# Patient Record
Sex: MALE | Race: OTHER | NOT HISPANIC OR LATINO | URBAN - METROPOLITAN AREA
[De-identification: names, ages, dates, MRNs, and addresses within clinical notes are randomized per-mention and may not be internally consistent; named-entity substitution may affect disease eponyms.]

---

## 2020-11-28 ENCOUNTER — EMERGENCY (EMERGENCY)
Facility: HOSPITAL | Age: 40
LOS: 1 days | Discharge: HOME | End: 2020-11-28
Attending: STUDENT IN AN ORGANIZED HEALTH CARE EDUCATION/TRAINING PROGRAM
Payer: COMMERCIAL

## 2020-11-28 VITALS
RESPIRATION RATE: 16 BRPM | SYSTOLIC BLOOD PRESSURE: 183 MMHG | OXYGEN SATURATION: 96 % | HEART RATE: 137 BPM | WEIGHT: 160.94 LBS | DIASTOLIC BLOOD PRESSURE: 119 MMHG | TEMPERATURE: 98 F

## 2020-11-28 VITALS
OXYGEN SATURATION: 100 % | SYSTOLIC BLOOD PRESSURE: 128 MMHG | RESPIRATION RATE: 20 BRPM | TEMPERATURE: 99 F | DIASTOLIC BLOOD PRESSURE: 82 MMHG | HEART RATE: 132 BPM

## 2020-11-28 DIAGNOSIS — D47.3 ESSENTIAL (HEMORRHAGIC) THROMBOCYTHEMIA: ICD-10-CM

## 2020-11-28 LAB
ANION GAP SERPL CALC-SCNC: 10 MMOL/L — SIGNIFICANT CHANGE UP (ref 5–17)
ANISOCYTOSIS BLD QL: SIGNIFICANT CHANGE UP
APTT BLD: 34.5 SEC — SIGNIFICANT CHANGE UP (ref 27.5–35.5)
BASOPHILS # BLD AUTO: 0.1 K/UL — SIGNIFICANT CHANGE UP (ref 0–0.2)
BASOPHILS NFR BLD AUTO: 0.4 % — SIGNIFICANT CHANGE UP (ref 0–2)
BUN SERPL-MCNC: 23 MG/DL — HIGH (ref 7–18)
CALCIUM SERPL-MCNC: 9 MG/DL — SIGNIFICANT CHANGE UP (ref 8.4–10.5)
CHLORIDE SERPL-SCNC: 100 MMOL/L — SIGNIFICANT CHANGE UP (ref 96–108)
CO2 SERPL-SCNC: 26 MMOL/L — SIGNIFICANT CHANGE UP (ref 22–31)
CREAT SERPL-MCNC: 1.15 MG/DL — SIGNIFICANT CHANGE UP (ref 0.5–1.3)
EOSINOPHIL # BLD AUTO: 0.13 K/UL — SIGNIFICANT CHANGE UP (ref 0–0.5)
EOSINOPHIL NFR BLD AUTO: 0.5 % — SIGNIFICANT CHANGE UP (ref 0–6)
GLUCOSE SERPL-MCNC: 181 MG/DL — HIGH (ref 70–99)
HCT VFR BLD CALC: 35.2 % — LOW (ref 39–50)
HGB BLD-MCNC: 11.8 G/DL — LOW (ref 13–17)
HYPOCHROMIA BLD QL: SLIGHT — SIGNIFICANT CHANGE UP
HYPOGRANULAR PLT: PRESENT
IMM GRANULOCYTES NFR BLD AUTO: 0.5 % — SIGNIFICANT CHANGE UP (ref 0–1.5)
INR BLD: 1.16 RATIO — SIGNIFICANT CHANGE UP (ref 0.88–1.16)
LACTATE SERPL-SCNC: 2 MMOL/L — SIGNIFICANT CHANGE UP (ref 0.7–2)
LG PLATELETS BLD QL AUTO: SIGNIFICANT CHANGE UP
LYMPHOCYTES # BLD AUTO: 2.38 K/UL — SIGNIFICANT CHANGE UP (ref 1–3.3)
LYMPHOCYTES # BLD AUTO: 9.8 % — LOW (ref 13–44)
MACROCYTES BLD QL: SIGNIFICANT CHANGE UP
MANUAL SMEAR VERIFICATION: SIGNIFICANT CHANGE UP
MCHC RBC-ENTMCNC: 29.7 PG — SIGNIFICANT CHANGE UP (ref 27–34)
MCHC RBC-ENTMCNC: 33.5 GM/DL — SIGNIFICANT CHANGE UP (ref 32–36)
MCV RBC AUTO: 88.7 FL — SIGNIFICANT CHANGE UP (ref 80–100)
MONOCYTES # BLD AUTO: 1.52 K/UL — HIGH (ref 0–0.9)
MONOCYTES NFR BLD AUTO: 6.3 % — SIGNIFICANT CHANGE UP (ref 2–14)
NEUTROPHILS # BLD AUTO: 19.95 K/UL — HIGH (ref 1.8–7.4)
NEUTROPHILS NFR BLD AUTO: 82.5 % — HIGH (ref 43–77)
NEUTS BAND # BLD: 1 % — SIGNIFICANT CHANGE UP (ref 0–8)
NRBC # BLD: 0 /100 WBCS — SIGNIFICANT CHANGE UP (ref 0–0)
NRBC # BLD: 0 /100 — SIGNIFICANT CHANGE UP (ref 0–0)
PLAT MORPH BLD: ABNORMAL
PLATELET # BLD AUTO: 1575 K/UL — CRITICAL HIGH (ref 150–400)
PLATELET COUNT - ESTIMATE: ABNORMAL
POTASSIUM SERPL-MCNC: 3.6 MMOL/L — SIGNIFICANT CHANGE UP (ref 3.5–5.3)
POTASSIUM SERPL-SCNC: 3.6 MMOL/L — SIGNIFICANT CHANGE UP (ref 3.5–5.3)
PROTHROM AB SERPL-ACNC: 13.7 SEC — HIGH (ref 10.6–13.6)
RAPID RVP RESULT: SIGNIFICANT CHANGE UP
RBC # BLD: 3.97 M/UL — LOW (ref 4.2–5.8)
RBC # FLD: 13 % — SIGNIFICANT CHANGE UP (ref 10.3–14.5)
RBC BLD AUTO: ABNORMAL
SARS-COV-2 RNA SPEC QL NAA+PROBE: SIGNIFICANT CHANGE UP
SMUDGE CELLS # BLD: PRESENT — SIGNIFICANT CHANGE UP
SODIUM SERPL-SCNC: 136 MMOL/L — SIGNIFICANT CHANGE UP (ref 135–145)
STOMATOCYTES BLD QL SMEAR: SLIGHT — SIGNIFICANT CHANGE UP
VARIANT LYMPHS # BLD: 2 % — SIGNIFICANT CHANGE UP (ref 0–6)
WBC # BLD: 24.2 K/UL — HIGH (ref 3.8–10.5)
WBC # FLD AUTO: 24.2 K/UL — HIGH (ref 3.8–10.5)

## 2020-11-28 PROCEDURE — 80048 BASIC METABOLIC PNL TOTAL CA: CPT

## 2020-11-28 PROCEDURE — 36415 COLL VENOUS BLD VENIPUNCTURE: CPT

## 2020-11-28 PROCEDURE — 96375 TX/PRO/DX INJ NEW DRUG ADDON: CPT

## 2020-11-28 PROCEDURE — 70487 CT MAXILLOFACIAL W/DYE: CPT

## 2020-11-28 PROCEDURE — 96361 HYDRATE IV INFUSION ADD-ON: CPT

## 2020-11-28 PROCEDURE — 85610 PROTHROMBIN TIME: CPT

## 2020-11-28 PROCEDURE — 96365 THER/PROPH/DIAG IV INF INIT: CPT | Mod: XU

## 2020-11-28 PROCEDURE — 87040 BLOOD CULTURE FOR BACTERIA: CPT

## 2020-11-28 PROCEDURE — 99292 CRITICAL CARE ADDL 30 MIN: CPT

## 2020-11-28 PROCEDURE — 71275 CT ANGIOGRAPHY CHEST: CPT

## 2020-11-28 PROCEDURE — 0225U NFCT DS DNA&RNA 21 SARSCOV2: CPT

## 2020-11-28 PROCEDURE — 93005 ELECTROCARDIOGRAM TRACING: CPT

## 2020-11-28 PROCEDURE — 85025 COMPLETE CBC W/AUTO DIFF WBC: CPT

## 2020-11-28 PROCEDURE — 71275 CT ANGIOGRAPHY CHEST: CPT | Mod: 26

## 2020-11-28 PROCEDURE — 85730 THROMBOPLASTIN TIME PARTIAL: CPT

## 2020-11-28 PROCEDURE — 99291 CRITICAL CARE FIRST HOUR: CPT

## 2020-11-28 PROCEDURE — 83605 ASSAY OF LACTIC ACID: CPT

## 2020-11-28 PROCEDURE — 99292 CRITICAL CARE ADDL 30 MIN: CPT | Mod: 25

## 2020-11-28 PROCEDURE — 70487 CT MAXILLOFACIAL W/DYE: CPT | Mod: 26

## 2020-11-28 PROCEDURE — 99291 CRITICAL CARE FIRST HOUR: CPT | Mod: 25

## 2020-11-28 RX ORDER — SODIUM CHLORIDE 9 MG/ML
1000 INJECTION INTRAMUSCULAR; INTRAVENOUS; SUBCUTANEOUS ONCE
Refills: 0 | Status: COMPLETED | OUTPATIENT
Start: 2020-11-28 | End: 2020-11-28

## 2020-11-28 RX ORDER — TRANEXAMIC ACID 100 MG/ML
5 INJECTION, SOLUTION INTRAVENOUS ONCE
Refills: 0 | Status: COMPLETED | OUTPATIENT
Start: 2020-11-28 | End: 2020-11-28

## 2020-11-28 RX ORDER — VALSARTAN 80 MG/1
160 TABLET ORAL ONCE
Refills: 0 | Status: COMPLETED | OUTPATIENT
Start: 2020-11-28 | End: 2020-11-28

## 2020-11-28 RX ORDER — SODIUM CHLORIDE 9 MG/ML
500 INJECTION INTRAMUSCULAR; INTRAVENOUS; SUBCUTANEOUS ONCE
Refills: 0 | Status: COMPLETED | OUTPATIENT
Start: 2020-11-28 | End: 2020-11-28

## 2020-11-28 RX ORDER — LABETALOL HCL 100 MG
5 TABLET ORAL ONCE
Refills: 0 | Status: COMPLETED | OUTPATIENT
Start: 2020-11-28 | End: 2020-11-28

## 2020-11-28 RX ORDER — SODIUM CHLORIDE 9 MG/ML
3 INJECTION INTRAMUSCULAR; INTRAVENOUS; SUBCUTANEOUS EVERY 8 HOURS
Refills: 0 | Status: DISCONTINUED | OUTPATIENT
Start: 2020-11-28 | End: 2020-12-01

## 2020-11-28 RX ORDER — HYDROXYUREA 500 MG/1
2 CAPSULE ORAL
Qty: 120 | Refills: 0
Start: 2020-11-28 | End: 2020-12-27

## 2020-11-28 RX ORDER — LABETALOL HCL 100 MG
10 TABLET ORAL ONCE
Refills: 0 | Status: DISCONTINUED | OUTPATIENT
Start: 2020-11-28 | End: 2020-11-28

## 2020-11-28 RX ADMIN — TRANEXAMIC ACID 5 MILLILITER(S): 100 INJECTION, SOLUTION INTRAVENOUS at 04:35

## 2020-11-28 RX ADMIN — SODIUM CHLORIDE 1000 MILLILITER(S): 9 INJECTION INTRAMUSCULAR; INTRAVENOUS; SUBCUTANEOUS at 08:06

## 2020-11-28 RX ADMIN — SODIUM CHLORIDE 1000 MILLILITER(S): 9 INJECTION INTRAMUSCULAR; INTRAVENOUS; SUBCUTANEOUS at 05:30

## 2020-11-28 RX ADMIN — SODIUM CHLORIDE 500 MILLILITER(S): 9 INJECTION INTRAMUSCULAR; INTRAVENOUS; SUBCUTANEOUS at 04:30

## 2020-11-28 RX ADMIN — Medication 5 MILLIGRAM(S): at 04:00

## 2020-11-28 RX ADMIN — Medication 1 MILLIGRAM(S): at 11:46

## 2020-11-28 RX ADMIN — SODIUM CHLORIDE 500 MILLILITER(S): 9 INJECTION INTRAMUSCULAR; INTRAVENOUS; SUBCUTANEOUS at 04:53

## 2020-11-28 RX ADMIN — VALSARTAN 160 MILLIGRAM(S): 80 TABLET ORAL at 11:46

## 2020-11-28 RX ADMIN — SODIUM CHLORIDE 3 MILLILITER(S): 9 INJECTION INTRAMUSCULAR; INTRAVENOUS; SUBCUTANEOUS at 04:52

## 2020-11-28 RX ADMIN — SODIUM CHLORIDE 1000 MILLILITER(S): 9 INJECTION INTRAMUSCULAR; INTRAVENOUS; SUBCUTANEOUS at 08:50

## 2020-11-28 RX ADMIN — SODIUM CHLORIDE 1000 MILLILITER(S): 9 INJECTION INTRAMUSCULAR; INTRAVENOUS; SUBCUTANEOUS at 06:41

## 2020-11-28 NOTE — ED PROVIDER NOTE - OBJECTIVE STATEMENT
40 year old male with PMHx of hypertension and no significant PSHx presents to the ED with complaints of bleeding from his wisdom tooth extraction site. Patient states that at approximately 12:00 yesterday he had his right-sided inferior wisdom tooth extracted after imaging showed tooth decay. Patient reports that as far as he is aware, the surgery was performed without any complications. Patient endorses that at approximately 22:00 last night he began having bleeding from the surgical site, which has persisted since then. Patient otherwise denies any pain, pus drainage, fever, infectious symptoms, lightheadedness, syncope, chest pain, shortness of breath, and all other acute complaints. Patient denies any recent illness or hospitalizations. Patient notes that he is not currently on any blood thinners.   Allergies: Keflex (pruritus), Amlodipine (pruritus) 40 year old male with PMHx of hypertension and no significant PSHx presents to the ED with complaints of bleeding from his wisdom tooth extraction site. Patient states that at approximately 12:00 noon yesterday he had his right-sided inferior wisdom tooth extracted after imaging showed tooth decay. Patient reports that as far as he is aware, the surgery was performed without any complications. Patient endorses that at approximately 22:00 last night he began having bleeding from the surgical site, which has persisted since then. Patient otherwise denies any pain, pus drainage, fever, infectious symptoms, lightheadedness, syncope, chest pain, shortness of breath, and all other acute complaints. Patient denies any recent illness or hospitalizations. Patient notes that he is not currently on any blood thinners.   Allergies: Keflex (pruritus), Amlodipine (pruritus)

## 2020-11-28 NOTE — ED PROVIDER NOTE - CLINICAL SUMMARY MEDICAL DECISION MAKING FREE TEXT BOX
Patient presents to the ED with complaints of bleeding from tooth extraction site. No signs of infection or arterial bleeding. Labs pending. Will apply TXA. Given Labetalol to lower blood pressure. Patient stable. Will reassess.

## 2020-11-28 NOTE — CONSULT NOTE ADULT - ASSESSMENT
40 year old presented with platelet 1.5 M and bleeding after tooth extraction.  the bleeding stopped now.  He denies any systemic symptoms.  he insists to go home.  but willing to take pills and follow up with doc.

## 2020-11-28 NOTE — ED PROVIDER NOTE - CARE PLAN
Principal Discharge DX:	Bleeding   Principal Discharge DX:	Thrombocytosis  Secondary Diagnosis:	Coagulopathy  Secondary Diagnosis:	Sepsis without acute organ dysfunction, due to unspecified organism   Principal Discharge DX:	Thrombocytosis  Secondary Diagnosis:	Coagulopathy  Secondary Diagnosis:	Sepsis without acute organ dysfunction, due to unspecified organism  Secondary Diagnosis:	Tachycardia

## 2020-11-28 NOTE — ED PROVIDER NOTE - PATIENT PORTAL LINK FT
You can access the FollowMyHealth Patient Portal offered by [facility] by registering at the following website: [email or web address]. By joining [employer]’s FollowMyHealth portal, you will also be able to view your health information using other applications (apps) compatible with our system.

## 2020-11-28 NOTE — CONSULT NOTE ADULT - PROBLEM SELECTOR RECOMMENDATION 9
smear showed many large platelets.  but no immature cells.  once platelet more than 1M, he can have bleeding issue.  the tooth bleeding stoped.  most likely he has essential thrombocythemia  he needs w/u plus treatment quickly  will start HU 1000mg bid  will not give ASA   he has tachycardia.  he said his HR can be 80's at home.  he tends to get nervous and has tachycardia easily  he refused to stay despite me and Dr. Elise repeated effort.
>120

## 2020-11-28 NOTE — ED PROVIDER NOTE - CARE PROVIDER_API CALL
Marciano Marquez  INTERNAL MEDICINE  [facility] [address] [place] [id number]  Phone: [phone]  Fax: [phone]  Follow Up Time: 4-6 Days

## 2020-11-28 NOTE — ED PROVIDER NOTE - NSFOLLOWUPINSTRUCTIONS_ED_ALL_ED_FT
Sepsis    WHAT YOU NEED TO KNOW:    Sepsis happens when an infection spreads and causes your body to react strongly to germs. Your body's defense system normally releases chemicals to fight off infection at the infected area. When infection spreads, chemicals are released throughout your body. The chemicals cause inflammation and clotting in small blood vessels that is difficult to control. Inflammation and clotting decrease blood flow and oxygen to your organs. This may cause your organs to stop working correctly. Sepsis is a life-threatening emergency.    DISCHARGE INSTRUCTIONS:    Call your local emergency number (911 in the ) if:   •You are short of breath or you cough up blood.      •You have a fast heart rate and your chest hurts.      •You feel so dizzy that you have trouble standing up.      Seek care immediately if:   •You have increased swelling in your legs, feet, or abdomen.      •Your lips or fingernails are blue.      Call your doctor if:   •You have a fever.      •You have muscle or joint pain.      •You begin to have trouble sleeping, nightmares, or panic attacks.      •You have questions or concerns about your condition or care.      Medicines:   •Medicines help treat an infection or decrease your symptoms.      •Take your medicine as directed. Contact your healthcare provider if you think your medicine is not helping or if you have side effects. Tell him of her if you are allergic to any medicine. Keep a list of the medicines, vitamins, and herbs you take. Include the amounts, and when and why you take them. Bring the list or the pill bottles to follow-up visits. Carry your medicine list with you in case of an emergency.      Prevent infection:   •Wash your hands often. Use soap and water. Wash your hands after you use the bathroom, change a child's diapers, or sneeze. Do not touch your eyes, nose, or mouth unless you have washed your hands first. Wash your hands before you prepare or eat food. Carry hand  with you. You can use it to clean your hands when soap and water are not available.  Handwashing           •Ask about vaccines. Vaccines can decrease your risk for certain infections, such as the flu or pneumonia.      •Prevent the spread of germs. Try to stay away from people who have a cold or the flu. If you are sick, stay away from others as much as possible.      Follow up with your doctor as directed: Write down your questions so you remember to ask them during your visits.       © Copyright [employer] 2020           back to top                          © Copyright [employer] 2020               Atrial Tachycardia    WHAT YOU NEED TO KNOW:  Atrial tachycardia is a condition that causes your heart to beat more than 100 times each minute. Atrial tachycardia is also called supraventricular tachycardia. It can develop because of problems with your heart's electrical system. Any of the following may also put you at risk for atrial tachycardia:•A heart condition, hypertension, or fatigue      •Anxiety, stress, or pain      •Large amounts of caffeine from coffee, tea, and energy drinks      •Heavy alcohol use or cigarette smoking      •Use of some medicines or street drugs    DISCHARGE INSTRUCTIONS:    Call 911 or have someone call if:  •You have any of the following signs of a heart attack:?Squeezing, pressure, or pain in your chest that lasts longer than a few minutes. Chest pain may come and go.      ?Pain in your jaw, neck, one or both arms, upper and lower back, or stomach.      ?Shortness of breath, or panting      ?Nausea or vomiting      ?Lightheadedness      •You cannot be woken.       Contact your healthcare provider if:  •Your pulse is faster than your healthcare provider said it should be.       •You have frequent periods of a fast heart rate.       •You feel weak or dizzy.      •You have questions or concerns about your condition or care.       Follow up with your healthcare provider as directed: You may need more tests. Write down your questions so you remember to ask them during your visits.    Prevention and Management:   •Decrease the amount of caffeine you drink. Caffeine can increase your heart rate.       •Limit or do not drink alcohol. Alcohol can increase your heart rate. Ask your healthcare provider if it is safe for you to drink alcohol. Also ask how much is safe for you to drink.       •Do not smoke. Nicotine and other chemicals in cigarettes can cause damage to your heart. Ask your healthcare provider for information if you currently smoke and need help to quit. E-cigarettes or smokeless tobacco still contain nicotine. Talk to your healthcare provider before you use these products.       •Do not use illegal drugs. Drugs such as meth and cocaine can increase your heart rate. Talk to your healthcare provider if you use illegal drugs and need help to quit.      •Get more rest. Fatigue can cause your heart rate to increase.       •Learn ways to cope with stress. Stress, fear, and anxiety can cause a fast heart rate. Your healthcare provider may recommend relaxation techniques and deep breathing exercises. Your healthcare provider may recommend you talk to someone about your stress or anxiety, such as a counselor or a trusted friend.      Check your pulse as directed: Your healthcare provider will show you how to check your pulse, and how often to check it. Write down how fast your pulse is and if it feels regular or like it is skipping beats. Also write down the activity you were doing if your heart rate is above 100. Bring the information with you to your follow-up appointment.   How to Take a Pulse            © Copyright [employer] 2020           back to top                          © Copyright [employer] 2020

## 2020-11-28 NOTE — ED PROVIDER NOTE - CHPI ED SYMPTOMS NEG
no pain, pus drainage, infectious symptoms, lightheadedness, syncope, chest pain, shortness of breath/no fever

## 2020-11-28 NOTE — ED PROVIDER NOTE - PHYSICAL EXAMINATION
Vital Signs Reviewed: Hypertensive and tachycardic  GEN: Comfortable, NAD, AAOx3  HEENT: NCAT, EOMI, MMM, Neck Supple  Dental: Right inferior posterior surgical site with oozing of blood. Nonpulsatile. Nontender. No pus.   RESP: CTAB, No rales/rhonchi/wheezing  CV: RRR, S1S2, No murmurs  ABD: No TTP, ND, No masses, No CVA Tenderness  Extrem/Skin: Equal pulses bilat, No cyanosis/edema/rashes  Neuro: No focal deficits

## 2020-11-28 NOTE — ED PROVIDER NOTE - PROGRESS NOTE DETAILS
Pt given 5mg labetalol to aid in hemostasis after which pt became acutely hypotensive, head of bed lowered and given 1L bolus after which BP wnl. Pt noted to have WBC >20K and Plt 1.5 million. Pt states that his Plt count had been high in the past with no further w/u. Pt signed out to Dr Elise pending CT max/face looking for infection. Patient received from Dr. Henderson. I called code sepsis due to tachycardia and leukocytosis, though abnormalities may be due to malignancy or other hematologic problem rather than infection. CT pending. Patient persistently tachycardic. I spoke with Dr. Marquez, who stated that when platelets over 1 million, bleeding risk increases. 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 Patient seen by Dr. Marquez. states patient can be treated with hydroxyurea instead of plateletpharesis. Patient wants to be discharged. I strongly recommended admission. I spoke to patient's father on the telephone at patient's request. patient still wants to be discharged. I d/w Dr. Marquez. He will return to speak to patient. Patients states he has h/o tachycardia whenever he is anxious and feels anxious now. He also used to be on medication for tachycardia, bystolic, which he stopped taking some time ago. Anxiety is diagnosis of exclusion for tachycardia, and I would not attribute patient's tachycardia to anxiety unless all other medical tests are negative. Dr. Marquez returned to speak to patient. Still wants to be discharged. He agreed to CTA chest before d/c as thrombocytosis increases risk of PE. Patient is resting comfortably, NAD. Still tachycardic (). Still refused admission. I explained to patietn that he may still have a serious medical condition that could lead to permanent disability or death. patient states understanding. AAOx3, gait steady, speech clear. Will f/u with Dr. Marquez in 4-5 days. Return to the ED immediately if getting worse, not improving, or if having any new or troubling symptoms.

## 2020-11-28 NOTE — ED ADULT TRIAGE NOTE - CHIEF COMPLAINT QUOTE
I got the wisdom tooth extraction yesterday noon and I have a bleeding in my mouth started at 22:00. I have HTN

## 2020-11-28 NOTE — CONSULT NOTE ADULT - SUBJECTIVE AND OBJECTIVE BOX
Patient is a 40y old  Male who presents with a chief complaint of     HPI, he has platelet 400 in 2017, it was 600 2018, it was 1.3M this year before wisdom tooth surgery.  he did not have sob, cp, dizziness, headache.  but he does have fatigue.  he never had bleeding issue ot thrombosis.  He began to bleed after tooth extraction.  but he took 2 motrin.         ROS:  Negative except for:    PAST MEDICAL & SURGICAL HISTORY:  HTN (hypertension)    No significant past surgical history        SOCIAL HISTORY:    FAMILY HISTORY:      MEDICATIONS  (STANDING):  LORazepam     Tablet 1 milliGRAM(s) Oral Once  sodium chloride 0.9% lock flush 3 milliLiter(s) IV Push every 8 hours  valsartan 160 milliGRAM(s) Oral once    MEDICATIONS  (PRN):      Allergies    amlodipine (Pruritus)  Keflex (Pruritus)    Intolerances        Vital Signs Last 24 Hrs  T(C): 37.3 (28 Nov 2020 11:02), Max: 37.3 (28 Nov 2020 11:02)  T(F): 99.1 (28 Nov 2020 11:02), Max: 99.1 (28 Nov 2020 11:02)  HR: 120 (28 Nov 2020 11:02) (98 - 139)  BP: 147/90 (28 Nov 2020 11:02) (81/53 - 183/119)  BP(mean): --  RR: 20 (28 Nov 2020 11:02) (16 - 20)  SpO2: 100% (28 Nov 2020 11:02) (96% - 100%)    PHYSICAL EXAM  General: adult in NAD  HEENT: clear oropharynx, anicteric sclera, pink conjunctiva  Neck: supple  CV: normal S1/S2 with no murmur rubs or gallops  Lungs: positive air movement b/l ant lungs,clear to auscultation, no wheezes, no rales  Abdomen: soft non-tender non-distended, no hepatosplenomegaly  Ext: no clubbing cyanosis or edema  Skin: no rashes and no petechiae  Neuro: alert and oriented X 4, no focal deficits      LABS:                          11.8   24.20 )-----------( 1575     ( 28 Nov 2020 04:16 )             35.2         Mean Cell Volume : 88.7 fl  Mean Cell Hemoglobin : 29.7 pg  Mean Cell Hemoglobin Concentration : 33.5 gm/dL  Auto Neutrophil # : 19.95 K/uL  Auto Lymphocyte # : 2.38 K/uL  Auto Monocyte # : 1.52 K/uL  Auto Eosinophil # : 0.13 K/uL  Auto Basophil # : 0.10 K/uL  Auto Neutrophil % : 82.5 %  Auto Lymphocyte % : 9.8 %  Auto Monocyte % : 6.3 %  Auto Eosinophil % : 0.5 %  Auto Basophil % : 0.4 %      Serial CBC's  11-28 @ 04:16  Hct-35.2 / Hgb-11.8 / Plat-1575 / RBC-3.97 / WBC-24.20      11-28    136  |  100  |  23<H>  ----------------------------<  181<H>  3.6   |  26  |  1.15    Ca    9.0      28 Nov 2020 04:16        PT/INR - ( 28 Nov 2020 04:16 )   PT: 13.7 sec;   INR: 1.16 ratio         PTT - ( 28 Nov 2020 04:16 )  PTT:34.5 sec                BLOOD SMEAR INTERPRETATION:       RADIOLOGY & ADDITIONAL STUDIES:

## 2020-12-03 LAB
CULTURE RESULTS: SIGNIFICANT CHANGE UP
CULTURE RESULTS: SIGNIFICANT CHANGE UP
SPECIMEN SOURCE: SIGNIFICANT CHANGE UP
SPECIMEN SOURCE: SIGNIFICANT CHANGE UP